# Patient Record
Sex: MALE | Race: OTHER | Employment: UNEMPLOYED
[De-identification: names, ages, dates, MRNs, and addresses within clinical notes are randomized per-mention and may not be internally consistent; named-entity substitution may affect disease eponyms.]

---

## 2022-03-18 PROBLEM — H52.223 REGULAR ASTIGMATISM OF BOTH EYES: Status: ACTIVE | Noted: 2022-02-23

## 2022-03-19 PROBLEM — H52.13 MYOPIA OF BOTH EYES: Status: ACTIVE | Noted: 2022-02-23

## 2022-10-17 ENCOUNTER — NURSE TRIAGE (OUTPATIENT)
Dept: OTHER | Facility: CLINIC | Age: 22
End: 2022-10-17

## 2022-10-17 NOTE — TELEPHONE ENCOUNTER
Location of patient: [place]    Received call from [place] at [employer] with [employer]. Subjective: Caller states \"abd pain\"     Current Symptoms: [facility] having n/v/d, mild diarrhea no fevers  having chills, having dark red blood in stool small amt, no significant med hx noted diarrhea just started over the last couple days     Onset: 7 days ago;     Associated Symptoms: NA    Pain Severity: 6/10; aching; constant goes up to  9    Temperature: none       What has been tried: peptobismal    LMP: NA Pregnant: NA    Recommended disposition: See in Office Today    Care advice provided, patient verbalizes understanding; denies any other questions or concerns; instructed to call back for any new or worsening symptoms. Patient/Caller agrees with recommended disposition; writer provided warm transfer to Mechelle Han at [facility] for appointment scheduling    Attention Provider: Thank you for allowing me to participate in the care of your patient. The patient was connected to triage in response to information provided to the E[facility]. Please do not respond through this encounter as the response is not directed to a shared pool.     Reason for Disposition   MODERATE pain (e.g., interferes with normal activities) that comes and goes (cramps) lasts > 24 hours  (Exception: pain with Vomiting or Diarrhea - see that Protocol)    Protocols used: Abdominal Pain - Male-ADULT-[place]

## 2022-10-18 NOTE — PROGRESS NOTES
Mackenzie Kaur M.D. Family and Sports Medicine  [address], [address]   Office : [phone]  Fax : [phone]    Chief Complaint   Patient presents with    Abdominal Pain     The pt is in for abdominal pain, n/v that was present for about a week, but has now resolved. The pt notes he had blood in his stool during that time and notes it was dark in color. He now reports that he is having constipation. He notes his last bowel movement was 2-3 days ago. History of Present Illness:  Maura Soni is a 25 y.o. male. HPI  61-year-old male presents with a complaint of abdominal pain, nausea and vomiting for the 2 weeks. Symptoms started after he ate a cheesesteak. Symptoms lasted for a little over a week. He described diffuse abdominal pain, diarrhea, nausea, vomiting. Took Pepto-Bismol which helped. He did endorse a few drops of blood in the stool during 1 episode of diarrhea but none since. He has had some constipation over the past several days with last bowel movement being 3 days ago. He denies any dysuria. He has been able to maintain good p.o. intake for the past several days. No history of similar issues. Denies any NSAID use. Denies any weight changes or fevers. Past Medical History:  History reviewed. No pertinent past medical history. Medications:   Prior to Admission medications    Not on File        ROS:  All other pertinent ROS are negative. See HPI for pertinent ROS    Vital Signs  /71 (Site: Right Upper Arm, Position: Sitting, Cuff Size: Medium Adult)   Pulse 77   Temp 98.6 °F (37 °C) (Temporal) Comment: 0  Ht 5' 11\" (1.803 m)   Wt 152 lb (68.9 kg)   SpO2 100%   BMI 21.20 kg/m²   Body mass index is 21.2 kg/m². Physical Exam  Vitals and nursing note reviewed. Constitutional:       General: He is not in acute distress. Appearance: He is normal weight. He is not ill-appearing. HENT:      Head: Normocephalic and atraumatic. Eyes:      Extraocular Movements: Extraocular movements intact. Conjunctiva/sclera: Conjunctivae normal.   Abdominal:      General: Abdomen is flat. Palpations: Abdomen is soft. Tenderness: There is no abdominal tenderness. There is no guarding. Musculoskeletal:         General: No deformity. Skin:     General: Skin is warm. Neurological:      General: No focal deficit present. Mental Status: He is alert and oriented to person, place, and time. Mental status is at baseline. Psychiatric:         Mood and Affect: Mood normal.         Behavior: Behavior normal.       PMH, PSH, SHx, FHx, Allergies, Medications reviewed and updated as indicated    Assessment/Plan:  Lynne Roy was seen today for abdominal pain. Diagnoses and all orders for this visit:    Generalized abdominal pain    Nausea and vomiting in adult    Gastroenteritis     81-year-old male presents with roughly 1 week of abdominal discomfort, nausea, vomiting, diarrhea after eating a cheese steak. Etiology likely consistent with gastroenterology. Symptoms have since gradually resolved and he was doing well. Discussed general course of illness. He declined any further work-up or labs. Discussed conservative measures including proper hydration, gradual increase in p.o. intake. Discussed precautions and signs to monitor for including any further blood in the stool, further issues with constipation or any additional abdominal pain. Return if symptoms worsen or fail to improve. 25 minutes was spent on the day of this encounter including face to face time with patient, chart review, reviewing pt hx, ordering meds/tests/procedures, documenting, and/or interpreting results.      __  Ted Hale M.D.

## 2022-10-19 ENCOUNTER — OFFICE VISIT (OUTPATIENT)
Dept: INTERNAL MEDICINE CLINIC | Facility: CLINIC | Age: 22
End: 2022-10-19
Payer: MEDICAID

## 2022-10-19 VITALS
TEMPERATURE: 98.6 F | OXYGEN SATURATION: 100 % | HEART RATE: 77 BPM | HEIGHT: 71 IN | BODY MASS INDEX: 21.28 KG/M2 | DIASTOLIC BLOOD PRESSURE: 71 MMHG | WEIGHT: 152 LBS | SYSTOLIC BLOOD PRESSURE: 117 MMHG

## 2022-10-19 DIAGNOSIS — R11.2 NAUSEA AND VOMITING IN ADULT: ICD-10-CM

## 2022-10-19 DIAGNOSIS — R10.84 GENERALIZED ABDOMINAL PAIN: Primary | ICD-10-CM

## 2022-10-19 DIAGNOSIS — K52.9 GASTROENTERITIS: ICD-10-CM

## 2022-10-19 PROCEDURE — 99213 OFFICE O/P EST LOW 20 MIN: CPT | Performed by: FAMILY MEDICINE

## 2022-10-19 ASSESSMENT — ANXIETY QUESTIONNAIRES
6. BECOMING EASILY ANNOYED OR IRRITABLE: 0
2. NOT BEING ABLE TO STOP OR CONTROL WORRYING: 0
7. FEELING AFRAID AS IF SOMETHING AWFUL MIGHT HAPPEN: 0
5. BEING SO RESTLESS THAT IT IS HARD TO SIT STILL: 0
GAD7 TOTAL SCORE: 0
3. WORRYING TOO MUCH ABOUT DIFFERENT THINGS: 0
1. FEELING NERVOUS, ANXIOUS, OR ON EDGE: 0
4. TROUBLE RELAXING: 0

## 2022-10-19 ASSESSMENT — PATIENT HEALTH QUESTIONNAIRE - PHQ9
SUM OF ALL RESPONSES TO PHQ9 QUESTIONS 1 & 2: 0
2. FEELING DOWN, DEPRESSED OR HOPELESS: 0
SUM OF ALL RESPONSES TO PHQ QUESTIONS 1-9: 0
1. LITTLE INTEREST OR PLEASURE IN DOING THINGS: 0
SUM OF ALL RESPONSES TO PHQ QUESTIONS 1-9: 0

## 2022-10-19 ASSESSMENT — ENCOUNTER SYMPTOMS
VOMITING: 1
CONSTIPATION: 1
RESPIRATORY NEGATIVE: 1
BLOOD IN STOOL: 1
DIARRHEA: 1
ABDOMINAL DISTENTION: 0
NAUSEA: 1

## 2022-10-19 NOTE — PROGRESS NOTES
Mary Augustin (:  2000) is a 25 y.o. male, here for evaluation of the following chief complaint(s):  Abdominal Pain (The pt is in for abdominal pain, n/v that was present for about a week, but has now resolved. The pt notes he had blood in his stool during that time and notes it was dark in color. He now reports that he is having constipation. He notes his last bowel movement was 2-3 days ago. /)           Subjective   SUBJECTIVE/OBJECTIVE:  MASSIEL Novoa is a 20yo male presenting with a complaint of abdominal pain, nausea, vomiting and diarrhea that started 2 week ago. Symptoms started after he ate a cheesestake and lasted about a week. Abdominal pain was diffuse. Last episode of diarrhea was a few days ago. He took peptobismol which helped. Pt noticed a small amount dark red blood in his stool a few days ago during an episode of diarrhea but hasnt noticed it since. Prior to this episode he has never noticed blood is his stool before. He feels that he is constipated now, last bowel movement 3 days ago. He has been able to keep food and liquids down over the past couple days. No history of GI or  problems. History reviewed. No pertinent past medical history. Prior to Visit Medications    Not on File        Review of Systems   Constitutional:  Positive for chills. Negative for activity change, fever and unexpected weight change. Respiratory: Negative. Cardiovascular: Negative. Gastrointestinal:  Positive for blood in stool, constipation, diarrhea, nausea and vomiting. Negative for abdominal distention. Genitourinary: Negative. Musculoskeletal: Negative. Skin: Negative. Allergic/Immunologic: Negative for food allergies. Neurological:  Negative for dizziness, light-headedness and headaches. Psychiatric/Behavioral: Negative.           Objective   Vitals:    10/19/22 1333   BP: 117/71   Site: Right Upper Arm   Position: Sitting   Cuff Size: Medium Adult   Pulse: 77   Temp: 98.6 °F (37 °C)   TempSrc: Temporal   SpO2: 100%   Weight: 152 lb (68.9 kg)   Height: 5' 11\" (1.803 m)        Physical Exam  Constitutional:       Appearance: Normal appearance. HENT:      Head: Normocephalic and atraumatic. Mouth/Throat:      Mouth: Mucous membranes are moist.   Cardiovascular:      Rate and Rhythm: Normal rate and regular rhythm. Pulses: Normal pulses. Pulmonary:      Effort: Pulmonary effort is normal.      Breath sounds: Normal breath sounds. Abdominal:      General: Bowel sounds are normal. There is no distension. Palpations: Abdomen is soft. Tenderness: There is no abdominal tenderness. There is no guarding. Neurological:      Mental Status: He is alert. ASSESSMENT/PLAN:  1. Generalized abdominal pain  2. Nausea and vomiting in adult- I suspected the cause was gastroenteritis due to contaminated food or a virus. Symptoms seem to have resolved. Pt instructed to continue to hydrate and take miralax if constipation continues. Pt declined a prescription for nausea or constipation. Follow up if symptoms fail to improve or get worse. An electronic signature was used to authenticate this note.     --Sandra Cones